# Patient Record
Sex: FEMALE | Race: WHITE | NOT HISPANIC OR LATINO | Employment: FULL TIME | URBAN - METROPOLITAN AREA
[De-identification: names, ages, dates, MRNs, and addresses within clinical notes are randomized per-mention and may not be internally consistent; named-entity substitution may affect disease eponyms.]

---

## 2020-01-01 ENCOUNTER — HOSPITAL ENCOUNTER (EMERGENCY)
Facility: HOSPITAL | Age: 40
Discharge: HOME | End: 2020-01-02
Attending: EMERGENCY MEDICINE
Payer: COMMERCIAL

## 2020-01-01 DIAGNOSIS — K59.00 CONSTIPATION: ICD-10-CM

## 2020-01-01 DIAGNOSIS — K62.89 RECTAL PAIN: Primary | ICD-10-CM

## 2020-01-01 DIAGNOSIS — K64.9 HEMORRHOIDS, UNSPECIFIED HEMORRHOID TYPE: ICD-10-CM

## 2020-01-01 LAB
ALBUMIN SERPL BCP-MCNC: 3.9 G/DL (ref 3.5–5.2)
ALP SERPL-CCNC: 55 U/L (ref 55–135)
ALT SERPL W/O P-5'-P-CCNC: 16 U/L (ref 10–44)
ANION GAP SERPL CALC-SCNC: 11 MMOL/L (ref 8–16)
AST SERPL-CCNC: 15 U/L (ref 10–40)
B-HCG UR QL: NEGATIVE
BACTERIA #/AREA URNS HPF: ABNORMAL /HPF
BASOPHILS # BLD AUTO: 0.09 K/UL (ref 0–0.2)
BASOPHILS NFR BLD: 0.9 % (ref 0–1.9)
BILIRUB SERPL-MCNC: 0.3 MG/DL (ref 0.1–1)
BILIRUB UR QL STRIP: NEGATIVE
BUN SERPL-MCNC: 9 MG/DL (ref 6–20)
CALCIUM SERPL-MCNC: 9.4 MG/DL (ref 8.7–10.5)
CAOX CRY URNS QL MICRO: ABNORMAL
CHLORIDE SERPL-SCNC: 103 MMOL/L (ref 95–110)
CLARITY UR: CLEAR
CO2 SERPL-SCNC: 25 MMOL/L (ref 23–29)
COLOR UR: YELLOW
CREAT SERPL-MCNC: 1 MG/DL (ref 0.5–1.4)
DIFFERENTIAL METHOD: NORMAL
EOSINOPHIL # BLD AUTO: 0.3 K/UL (ref 0–0.5)
EOSINOPHIL NFR BLD: 2.9 % (ref 0–8)
ERYTHROCYTE [DISTWIDTH] IN BLOOD BY AUTOMATED COUNT: 12 % (ref 11.5–14.5)
EST. GFR  (AFRICAN AMERICAN): >60 ML/MIN/1.73 M^2
EST. GFR  (NON AFRICAN AMERICAN): >60 ML/MIN/1.73 M^2
GLUCOSE SERPL-MCNC: 107 MG/DL (ref 70–110)
GLUCOSE UR QL STRIP: NEGATIVE
HCT VFR BLD AUTO: 38.5 % (ref 37–48.5)
HGB BLD-MCNC: 12.9 G/DL (ref 12–16)
HGB UR QL STRIP: ABNORMAL
HIV 1+2 AB+HIV1 P24 AG SERPL QL IA: NEGATIVE
IMM GRANULOCYTES # BLD AUTO: 0.04 K/UL (ref 0–0.04)
IMM GRANULOCYTES NFR BLD AUTO: 0.4 % (ref 0–0.5)
KETONES UR QL STRIP: ABNORMAL
LEUKOCYTE ESTERASE UR QL STRIP: NEGATIVE
LYMPHOCYTES # BLD AUTO: 3 K/UL (ref 1–4.8)
LYMPHOCYTES NFR BLD: 29.7 % (ref 18–48)
MCH RBC QN AUTO: 29.9 PG (ref 27–31)
MCHC RBC AUTO-ENTMCNC: 33.5 G/DL (ref 32–36)
MCV RBC AUTO: 89 FL (ref 82–98)
MICROSCOPIC COMMENT: ABNORMAL
MONOCYTES # BLD AUTO: 0.6 K/UL (ref 0.3–1)
MONOCYTES NFR BLD: 6.3 % (ref 4–15)
NEUTROPHILS # BLD AUTO: 6.1 K/UL (ref 1.8–7.7)
NEUTROPHILS NFR BLD: 59.8 % (ref 38–73)
NITRITE UR QL STRIP: NEGATIVE
NRBC BLD-RTO: 0 /100 WBC
PH UR STRIP: 6 [PH] (ref 5–8)
PLATELET # BLD AUTO: 300 K/UL (ref 150–350)
PMV BLD AUTO: 10.9 FL (ref 9.2–12.9)
POTASSIUM SERPL-SCNC: 3.9 MMOL/L (ref 3.5–5.1)
PROT SERPL-MCNC: 7.1 G/DL (ref 6–8.4)
PROT UR QL STRIP: NEGATIVE
RBC # BLD AUTO: 4.31 M/UL (ref 4–5.4)
RBC #/AREA URNS HPF: 1 /HPF (ref 0–4)
SODIUM SERPL-SCNC: 139 MMOL/L (ref 136–145)
SP GR UR STRIP: >=1.03 (ref 1–1.03)
URN SPEC COLLECT METH UR: ABNORMAL
UROBILINOGEN UR STRIP-ACNC: NEGATIVE EU/DL
WBC # BLD AUTO: 10.16 K/UL (ref 3.9–12.7)
WBC #/AREA URNS HPF: 1 /HPF (ref 0–5)

## 2020-01-01 PROCEDURE — 25500020 PHARM REV CODE 255: Performed by: EMERGENCY MEDICINE

## 2020-01-01 PROCEDURE — 63600175 PHARM REV CODE 636 W HCPCS: Performed by: NURSE PRACTITIONER

## 2020-01-01 PROCEDURE — 85025 COMPLETE CBC W/AUTO DIFF WBC: CPT

## 2020-01-01 PROCEDURE — 80053 COMPREHEN METABOLIC PANEL: CPT

## 2020-01-01 PROCEDURE — 81000 URINALYSIS NONAUTO W/SCOPE: CPT

## 2020-01-01 PROCEDURE — 36415 COLL VENOUS BLD VENIPUNCTURE: CPT

## 2020-01-01 PROCEDURE — 99285 EMERGENCY DEPT VISIT HI MDM: CPT | Mod: 25

## 2020-01-01 PROCEDURE — 81025 URINE PREGNANCY TEST: CPT

## 2020-01-01 PROCEDURE — 86703 HIV-1/HIV-2 1 RESULT ANTBDY: CPT

## 2020-01-01 PROCEDURE — 96374 THER/PROPH/DIAG INJ IV PUSH: CPT | Mod: 59

## 2020-01-01 RX ORDER — BISACODYL 5 MG
5 TABLET, DELAYED RELEASE (ENTERIC COATED) ORAL ONCE
COMMUNITY

## 2020-01-01 RX ORDER — DICLOFENAC SODIUM 50 MG/1
50 TABLET, DELAYED RELEASE ORAL 3 TIMES DAILY PRN
Qty: 15 TABLET | Refills: 0 | Status: SHIPPED | OUTPATIENT
Start: 2020-01-01

## 2020-01-01 RX ORDER — HYDROCODONE BITARTRATE AND ACETAMINOPHEN 7.5; 325 MG/1; MG/1
1 TABLET ORAL EVERY 6 HOURS PRN
COMMUNITY

## 2020-01-01 RX ORDER — KETOROLAC TROMETHAMINE 30 MG/ML
30 INJECTION, SOLUTION INTRAMUSCULAR; INTRAVENOUS
Status: COMPLETED | OUTPATIENT
Start: 2020-01-01 | End: 2020-01-01

## 2020-01-01 RX ORDER — ESCITALOPRAM OXALATE 20 MG/1
20 TABLET ORAL DAILY
COMMUNITY

## 2020-01-01 RX ADMIN — IOHEXOL 100 ML: 350 INJECTION, SOLUTION INTRAVENOUS at 11:01

## 2020-01-01 RX ADMIN — KETOROLAC TROMETHAMINE 30 MG: 30 INJECTION, SOLUTION INTRAMUSCULAR at 10:01

## 2020-01-02 ENCOUNTER — OFFICE VISIT (OUTPATIENT)
Dept: GASTROENTEROLOGY | Facility: CLINIC | Age: 40
End: 2020-01-02
Payer: COMMERCIAL

## 2020-01-02 VITALS
HEIGHT: 66 IN | HEART RATE: 80 BPM | BODY MASS INDEX: 47.09 KG/M2 | DIASTOLIC BLOOD PRESSURE: 74 MMHG | WEIGHT: 293 LBS | SYSTOLIC BLOOD PRESSURE: 128 MMHG

## 2020-01-02 VITALS
HEART RATE: 63 BPM | BODY MASS INDEX: 47.09 KG/M2 | TEMPERATURE: 98 F | WEIGHT: 293 LBS | RESPIRATION RATE: 16 BRPM | DIASTOLIC BLOOD PRESSURE: 67 MMHG | OXYGEN SATURATION: 99 % | HEIGHT: 66 IN | SYSTOLIC BLOOD PRESSURE: 145 MMHG

## 2020-01-02 DIAGNOSIS — K92.1 BLOOD IN STOOL: Primary | ICD-10-CM

## 2020-01-02 DIAGNOSIS — K59.00 CONSTIPATION, UNSPECIFIED CONSTIPATION TYPE: ICD-10-CM

## 2020-01-02 DIAGNOSIS — K60.2 ANAL FISSURE: ICD-10-CM

## 2020-01-02 DIAGNOSIS — K62.89 RECTAL PAIN: ICD-10-CM

## 2020-01-02 PROCEDURE — 99204 PR OFFICE/OUTPT VISIT, NEW, LEVL IV, 45-59 MIN: ICD-10-PCS | Mod: S$GLB,,, | Performed by: INTERNAL MEDICINE

## 2020-01-02 PROCEDURE — 3008F BODY MASS INDEX DOCD: CPT | Mod: CPTII,S$GLB,, | Performed by: INTERNAL MEDICINE

## 2020-01-02 PROCEDURE — 99999 PR PBB SHADOW E&M-EST. PATIENT-LVL III: CPT | Mod: PBBFAC,,, | Performed by: INTERNAL MEDICINE

## 2020-01-02 PROCEDURE — 99204 OFFICE O/P NEW MOD 45 MIN: CPT | Mod: S$GLB,,, | Performed by: INTERNAL MEDICINE

## 2020-01-02 PROCEDURE — 3008F PR BODY MASS INDEX (BMI) DOCUMENTED: ICD-10-PCS | Mod: CPTII,S$GLB,, | Performed by: INTERNAL MEDICINE

## 2020-01-02 PROCEDURE — 99999 PR PBB SHADOW E&M-EST. PATIENT-LVL III: ICD-10-PCS | Mod: PBBFAC,,, | Performed by: INTERNAL MEDICINE

## 2020-01-02 RX ORDER — MULTIVITAMIN
1 TABLET ORAL DAILY
COMMUNITY

## 2020-01-02 RX ORDER — SODIUM, POTASSIUM,MAG SULFATES 17.5-3.13G
1 SOLUTION, RECONSTITUTED, ORAL ORAL DAILY
Qty: 1 KIT | Refills: 0 | Status: SHIPPED | OUTPATIENT
Start: 2020-01-02 | End: 2020-01-04

## 2020-01-02 NOTE — ED PROVIDER NOTES
"SCRIBE #1 NOTE: I, Sandro Renteria, am scribing for, and in the presence of, Barrett Josue NP. I have scribed the HPI, ROS.     SCRIBE #2 NOTE: I, Raman Rucker, am scribing for, and in the presence of,  Barrett Josue NP. I have scribed the remaining portions of the note not scribed by Scribe #1.      History     Chief Complaint   Patient presents with    Rectal Pain     rectal pain and constipation x4 weeks; believes it may be an "anal fissure"     Review of patient's allergies indicates:  No Known Allergies      History of Present Illness     HPI    2020, 9:43 PM  History obtained from the patient      History of Present Illness: Ingrid Mai is a 39 y.o. female patient with a PMHx of anxiety, depression who presents to the Emergency Department for evaluation of a possible anal fissure which onset gradually 4 weeks PTA. Pt reports that she has had an anal fissure before, and that her current sxs are similar to previous fissures. Symptoms are constant and moderate in severity. No mitigating or exacerbating factors reported. Associated sxs include anal bleeding, rectal pain, constipation. Patient denies any fever, chills, sore throat, cough, n/v/d, CP, SOB, HA, syncope, weakness, and all other sxs at this time. No further complaints or concerns at this time.         Arrival mode: Personal vehicle     PCP: Primary Doctor No        Past Medical History:  Past Medical History:   Diagnosis Date    Anxiety     Depression        Past Surgical History:  Past Surgical History:   Procedure Laterality Date     SECTION      CHOLECYSTECTOMY           Family History:  History reviewed. No pertinent family history.      Social History:  Social History     Tobacco Use    Smoking status: Current Every Day Smoker     Types: Vaping with nicotine   Substance and Sexual Activity    Alcohol use: Not on file    Drug use: Not on file    Sexual activity: Yes     Birth control/protection: IUD        Review of Systems "     Review of Systems   Constitutional: Negative for activity change, appetite change, chills, diaphoresis and fever.   HENT: Negative for congestion, drooling, ear pain, mouth sores, rhinorrhea, sinus pain, sore throat and trouble swallowing.    Eyes: Negative for pain and discharge.   Respiratory: Negative for cough, chest tightness, shortness of breath, wheezing and stridor.    Cardiovascular: Negative for chest pain, palpitations and leg swelling.   Gastrointestinal: Positive for anal bleeding, constipation and rectal pain. Negative for abdominal distention, abdominal pain, blood in stool, diarrhea, nausea and vomiting.        (+) possible anal fissure     Genitourinary: Negative for difficulty urinating, dysuria, flank pain, frequency, hematuria and urgency.   Musculoskeletal: Negative for arthralgias, back pain and myalgias.   Skin: Negative for pallor, rash and wound.   Neurological: Negative for dizziness, syncope, weakness, light-headedness and numbness.   All other systems reviewed and are negative.       Physical Exam     Initial Vitals [01/01/20 2113]   BP Pulse Resp Temp SpO2   (!) 150/75 69 16 98.2 °F (36.8 °C) 97 %      MAP       --          Physical Exam  Nursing Notes and Vital Signs Reviewed.  Constitutional: Patient is in no acute distress. Obese.  Head: Atraumatic. Normocephalic.  Eyes: PERRL. EOM intact. Conjunctivae are not pale. No scleral icterus.  ENT: Mucous membranes are moist. Oropharynx is clear and symmetric.    Neck: Supple. Full ROM. No lymphadenopathy.  Cardiovascular: Regular rate. Regular rhythm. No murmurs, rubs, or gallops. Distal pulses are 2+ and symmetric.  Pulmonary/Chest: No respiratory distress. Clear to auscultation bilaterally. No wheezing or rales.  Abdominal: Soft and non-distended.  There is no tenderness.  No rebound, guarding, or rigidity. Good bowel sounds.  Genitourinary: No CVA tenderness  Musculoskeletal: Moves all extremities. No obvious deformities. No edema.  "No calf tenderness.  Skin: Warm and dry.  Neurological:  Alert, awake, and appropriate.  Normal speech.  No acute focal neurological deficits are appreciated.  Psychiatric: Normal affect. Good eye contact. Appropriate in content.  Rectal: Female chaperone present for the duration of the rectal exam. There are external hemorrhoids. There is severe tenderness at the anus so exam limited.       ED Course   Procedures  ED Vital Signs:  Vitals:    01/01/20 2113 01/02/20 0010   BP: (!) 150/75 (!) 145/67   Pulse: 69 63   Resp: 16 16   Temp: 98.2 °F (36.8 °C) 98.4 °F (36.9 °C)   TempSrc: Oral Oral   SpO2: 97% 99%   Weight: 134 kg (295 lb 6.7 oz)    Height: 5' 6" (1.676 m)        Abnormal Lab Results:  Labs Reviewed   URINALYSIS, REFLEX TO URINE CULTURE - Abnormal; Notable for the following components:       Result Value    Specific Gravity, UA >=1.030 (*)     Ketones, UA Trace (*)     Occult Blood UA 2+ (*)     All other components within normal limits   URINALYSIS MICROSCOPIC - Abnormal; Notable for the following components:    Ca Oxalate Svetlana, UA Many (*)     All other components within normal limits   HIV 1 / 2 ANTIBODY   CBC W/ AUTO DIFFERENTIAL   COMPREHENSIVE METABOLIC PANEL   PREGNANCY TEST, URINE RAPID   URINALYSIS, REFLEX TO URINE CULTURE        All Lab Results:  Results for orders placed or performed during the hospital encounter of 01/01/20   HIV 1/2 Ag/Ab (4th Gen)   Result Value Ref Range    HIV 1/2 Ag/Ab Negative Negative   CBC auto differential   Result Value Ref Range    WBC 10.16 3.90 - 12.70 K/uL    RBC 4.31 4.00 - 5.40 M/uL    Hemoglobin 12.9 12.0 - 16.0 g/dL    Hematocrit 38.5 37.0 - 48.5 %    Mean Corpuscular Volume 89 82 - 98 fL    Mean Corpuscular Hemoglobin 29.9 27.0 - 31.0 pg    Mean Corpuscular Hemoglobin Conc 33.5 32.0 - 36.0 g/dL    RDW 12.0 11.5 - 14.5 %    Platelets 300 150 - 350 K/uL    MPV 10.9 9.2 - 12.9 fL    Immature Granulocytes 0.4 0.0 - 0.5 %    Gran # (ANC) 6.1 1.8 - 7.7 K/uL    " Immature Grans (Abs) 0.04 0.00 - 0.04 K/uL    Lymph # 3.0 1.0 - 4.8 K/uL    Mono # 0.6 0.3 - 1.0 K/uL    Eos # 0.3 0.0 - 0.5 K/uL    Baso # 0.09 0.00 - 0.20 K/uL    nRBC 0 0 /100 WBC    Gran% 59.8 38.0 - 73.0 %    Lymph% 29.7 18.0 - 48.0 %    Mono% 6.3 4.0 - 15.0 %    Eosinophil% 2.9 0.0 - 8.0 %    Basophil% 0.9 0.0 - 1.9 %    Differential Method Automated    Comprehensive metabolic panel   Result Value Ref Range    Sodium 139 136 - 145 mmol/L    Potassium 3.9 3.5 - 5.1 mmol/L    Chloride 103 95 - 110 mmol/L    CO2 25 23 - 29 mmol/L    Glucose 107 70 - 110 mg/dL    BUN, Bld 9 6 - 20 mg/dL    Creatinine 1.0 0.5 - 1.4 mg/dL    Calcium 9.4 8.7 - 10.5 mg/dL    Total Protein 7.1 6.0 - 8.4 g/dL    Albumin 3.9 3.5 - 5.2 g/dL    Total Bilirubin 0.3 0.1 - 1.0 mg/dL    Alkaline Phosphatase 55 55 - 135 U/L    AST 15 10 - 40 U/L    ALT 16 10 - 44 U/L    Anion Gap 11 8 - 16 mmol/L    eGFR if African American >60 >60 mL/min/1.73 m^2    eGFR if non African American >60 >60 mL/min/1.73 m^2   Pregnancy, urine rapid (UPT)   Result Value Ref Range    Preg Test, Ur Negative    Urinalysis, Reflex to Urine Culture   Result Value Ref Range    Specimen UA Urine, Supra Pubic     Color, UA Yellow Yellow, Straw, Jade    Appearance, UA Clear Clear    pH, UA 6.0 5.0 - 8.0    Specific Gravity, UA >=1.030 (A) 1.005 - 1.030    Protein, UA Negative Negative    Glucose, UA Negative Negative    Ketones, UA Trace (A) Negative    Bilirubin (UA) Negative Negative    Occult Blood UA 2+ (A) Negative    Nitrite, UA Negative Negative    Urobilinogen, UA Negative <2.0 EU/dL    Leukocytes, UA Negative Negative   Urinalysis Microscopic   Result Value Ref Range    RBC, UA 1 0 - 4 /hpf    WBC, UA 1 0 - 5 /hpf    Bacteria Rare None-Occ /hpf    Ca Oxalate Svetlana, UA Many (A) None-Moderate    Microscopic Comment SEE COMMENT          Imaging Results:  Imaging Results          CT Abdomen Pelvis With Contrast (Final result)  Result time 01/01/20 23:44:51    Final  result by Terence Sanders MD (01/01/20 23:44:51)                 Impression:      There are bilateral nonobstructing kidney stones.  No perirectal abscess is demonstrated.    All CT scans at (this location) are performed using dose modulation techniques as appropriate to a performed exam including the following:  automated exposure control; adjustment of the mA and /or kV according to patient size (this includes techniques or standardized protocols for targeted exams where dose is matched to indication/reason for exam: i.e. extremities or head); use of iterative reconstruction technique.      Electronically signed by: Terence Sanders  Date:    01/01/2020  Time:    23:44             Narrative:    EXAMINATION:  CT ABDOMEN PELVIS WITH CONTRAST    CLINICAL HISTORY:  Localized swelling, mass and lump of skin, subcu;rectal pain/ anal tenderness;    TECHNIQUE:  Low dose axial images, sagittal and coronal reformations were obtained from the lung bases to the pubic symphysis following the IV administration of 100 mL of Omnipaque 350 .    COMPARISON:  None.    FINDINGS:  ABDOMEN    Lung bases: Unremarkable    Liver/gallbladder/biliary: The liver demonstrates no focal abnormality.  An elongated appearance to the right lobe is present.  This has the appearance of a normal variant Riedel's lobe.  The gallbladder is surgically absent.No biliary ductal dilation.    Pancreas: The pancreas is unremarkable in appearance.    Spleen: The spleen is not enlarged.    Adrenals: Unremarkable    Kidneys: There are punctate nonobstructing stones in lower pole the right kidney.  A larger stone measuring 0.5 cm is seen in the upper pole of the left kidney with notched appearance to the upper pole cortex suggesting old scarring.  A small punctate 0.3 cm stone is seen in the lower pole.  The kidneys bilaterally appear free of acute obstruction or mass.  The ureters are normal in appearance.  The bladder appears free of focal  abnormality.    Bowel/Mesentery: There is no evidence of bowel obstruction.  No mesenteric stranding or adenopathy.    Retroperitoneum: No adenopathy.The aorta demonstrates a normal caliber.    The patient reports anal fissure.  No rectal or perirectal abscess or suspicious fluid collections are demonstrated.    PELVIS:    Genitourinary/Reproductive organs: An IUD is present which appears appropriately positioned within the uterus.    Adenopathy: None    Free Fluid: No free fluid    Osseus Structures/Soft tissues: No suspicious appearing osseus lesions. No significant soft tissue abnormality.                               X-Ray Abdomen Flat And Erect (Final result)  Result time 01/01/20 22:04:25    Final result by SHAE Sarmiento Sr., MD (01/01/20 22:04:25)                 Impression:      1. There is a prominent amount of fecal material within the gastrointestinal system.  This is consistent with the patient's history.  2. There is a 5 mm calcification projected over the midpole of the left kidney.  This is characteristic of nephrolithiasis.  3. Surgical changes  4. There are several oval-shaped calcific densities projected over the pelvis.  These are characteristic of phleboliths.      Electronically signed by: Faisal Sarmiento MD  Date:    01/01/2020  Time:    22:04             Narrative:    EXAMINATION:  XR ABDOMEN FLAT AND ERECT    CLINICAL HISTORY:  Constipation, unspecified    COMPARISON:  None    FINDINGS:  There is a prominent amount of fecal material within the gastrointestinal system.  There is a 5 mm calcification projected over the midpole of the left kidney.  There are surgical clips projected over the right upper quadrant of the abdomen.  There is no pneumoperitoneum. The bony structures appear intact.  There is a T-type intrauterine device projected over the pelvis.  There are several oval-shaped calcific densities projected over the pelvis.                                        The Emergency  Provider reviewed the vital signs and test results, which are outlined above.     ED Discussion   I discussed with patient and/or family/caretaker that evaluation in the ED does not suggest any emergent or life threatening medical conditions requiring immediate intervention beyond what was provided in the ED, and I believe patient is safe for discharge. Regardless, an unremarkable evaluation in the ED does not preclude the development or presence of a serious of life threatening condition. As such, patient was instructed to return immediately for any worsening or change in current symptoms.    Regarding HEMORRHOIDS, I recommended that the patient manage symptoms by: applying ice on rectum for 15 to 20 minutes every hour (using an ice pack) to prevent tissue damage and decrease swelling and pain; taking sitz baths (20 minutes, two or three times daily); and keeping anal area clean (washing gently with warm water or moist towelettes). For hemorrhoid prevention, recommended that the patient avoid straining when having bowel movements, exercise regularly, drink plenty of liquids, eat high fiber foods, and avoid anal sex.  Advised patient to follow up with gastroenterologist or primary care provider for further evaluation and treatment.                  ED Medication(s):  Medications   ketorolac injection 30 mg (30 mg Intravenous Given 1/1/20 6844)   iohexol (OMNIPAQUE 350) injection 100 mL (100 mLs Intravenous Given 1/1/20 7697)       Discharge Medication List as of 1/1/2020 11:57 PM      START taking these medications    Details   diclofenac (VOLTAREN) 50 MG EC tablet Take 1 tablet (50 mg total) by mouth 3 (three) times daily as needed., Starting Wed 1/1/2020, Print      hydrocortisone-pramoxine (PROCTOFOAM-HS) rectal foam Place 1 applicator rectally 2 (two) times daily., Starting Wed 1/1/2020, Print             Follow-up Information     Schedule an appointment as soon as possible for a visit  with [facility]  Gastroenterology.    Specialty:  Gastroenterology  Contact information:  [address]  [facility] [address] [phone]           [facility] - .    Specialty:  Emergency Medicine  Why:  As needed, If symptoms worsen  Contact information:  [address]  [facility] [address] [phone]                     Scribe Attestation:   Scribe #1: I performed the above scribed service and the documentation accurately describes the services I performed. I attest to the accuracy of the note.     Attending:   Physician Attestation Statement for Scribe #1: I, Barrett Josue NP, personally performed the services described in this documentation, as scribed by Sandro Renteria, in my presence, and it is both accurate and complete.       Scribe Attestation:   Scribe #2: I performed the above scribed service and the documentation accurately describes the services I performed. I attest to the accuracy of the note.    Attending Attestation:           Physician Attestation for Scribe:    Physician Attestation Statement for Scribe #2: I, Barrett Josue NP, reviewed documentation, as scribed by Raman Rucker in my presence, and it is both accurate and complete. I also acknowledge and confirm the content of the note done by Scribe #1.           Clinical Impression       ICD-10-CM ICD-9-CM   1. Rectal pain K62.89 569.42   2. Constipation K59.00 564.00   3. Hemorrhoids, unspecified hemorrhoid type K64.9 455.6       Disposition:   Disposition: Discharged  Condition: Stable         Barrett Josue NP  01/02/20 0130

## 2020-01-02 NOTE — PROGRESS NOTES
[facility]  Gastroenterology    PCP: Primary Doctor No    20    [facility] [place]     Er Follow up      Additional comments: Possible Anal Fissure          Last edited by Lauryn Cortés LPN on 2020  2:55 PM. (History)        Reason for Visit: ER Follow-up     Subjective:   Ingrid Mai is a 39 y.o. female with history of anxiety/depression, chronic constipation and anal fissure who presents for ER follow-up. Patient reports she has had longstanding constipation and a history of anal fissure in the past. Recently, over the holidays she had constipation again and after straining to have a BM, she developed severe rectal pain consistent with an anal fissure, similar to the pain she had the time before. She did not some blood from rectum on occasion. She has been taking stool softeners but no Miralax. The pain has been going on for about 2-3 weeks. The pain worsened significantly yesterday after passing another hard stool so she went to the ED for evaluation. They did CT A/P which was negative for any anal abscess. She was too tender for rectal exam at that time. She was prescribed proctofoam and scheduled GI follow-up. She has not filled the proctofoam yet. She still has rectal pain and constipation. She has never had a colonoscopy before.       Past Medical History:   Diagnosis Date    Anxiety     Depression        Past Surgical History:   Procedure Laterality Date     SECTION      CHOLECYSTECTOMY         Current Outpatient Medications on File Prior to Visit   Medication Sig Dispense Refill    bisacodyl (DULCOLAX) 5 mg EC tablet Take 5 mg by mouth once.       cetirizine (ZYRTEC) 10 mg Cap Take 10 mg by mouth.      escitalopram oxalate (LEXAPRO) 20 MG tablet Take 20 mg by mouth once daily.      multivitamin (THERAGRAN) per tablet Take 1 tablet by mouth once daily.      diclofenac (VOLTAREN) 50 MG EC tablet Take 1 tablet (50 mg total) by mouth 3 (three) times daily as needed. (Patient not  taking: Reported on 1/2/2020) 15 tablet 0    HYDROcodone-acetaminophen (NORCO) 7.5-325 mg per tablet Take 1 tablet by mouth every 6 (six) hours as needed for Pain.      hydrocortisone-pramoxine (PROCTOFOAM-HS) rectal foam Place 1 applicator rectally 2 (two) times daily. (Patient not taking: Reported on 1/2/2020) 12 applicator 0     Current Facility-Administered Medications on File Prior to Visit   Medication Dose Route Frequency Provider Last Rate Last Dose    [COMPLETED] iohexol (OMNIPAQUE 350) injection 100 mL  100 mL Intravenous ONCE PRN Agustín Hernandez Jr., MD   100 mL at 01/01/20 2329    [COMPLETED] ketorolac injection 30 mg  30 mg Intravenous ED 1 Time Barrett Josue NP   30 mg at 01/01/20 2244       Review of patient's allergies indicates:  No Known Allergies    Social History     Socioeconomic History    Marital status:      Spouse name: Not on file    Number of children: Not on file    Years of education: Not on file    Highest education level: Not on file   Occupational History    Not on file   Social Needs    Financial resource strain: Not on file    Food insecurity:     Worry: Not on file     Inability: Not on file    Transportation needs:     Medical: Not on file     Non-medical: Not on file   Tobacco Use    Smoking status: Current Every Day Smoker     Types: Vaping with nicotine   Substance and Sexual Activity    Alcohol use: Not on file    Drug use: Not on file    Sexual activity: Yes     Birth control/protection: IUD   Lifestyle    Physical activity:     Days per week: Not on file     Minutes per session: Not on file    Stress: Not on file   Relationships    Social connections:     Talks on phone: Not on file     Gets together: Not on file     Attends Christian service: Not on file     Active member of club or organization: Not on file     Attends meetings of clubs or organizations: Not on file     Relationship status: Not on file   Other Topics Concern    Not on file    Social History Narrative    Not on file       No family history on file.    Review of Systems   Constitutional: Negative for appetite change, fever and unexpected weight change.   HENT: Negative for postnasal drip, rhinorrhea, sneezing, sore throat and trouble swallowing.    Eyes: Negative for visual disturbance.   Respiratory: Negative for cough, shortness of breath and wheezing.    Cardiovascular: Negative for chest pain, palpitations and leg swelling.   Gastrointestinal: Positive for blood in stool, constipation and rectal pain. Negative for abdominal pain, diarrhea, nausea and vomiting.   Genitourinary: Negative for dysuria.   Musculoskeletal: Negative for arthralgias, joint swelling and myalgias.   Skin: Negative for color change, pallor and rash.   Neurological: Negative for weakness, light-headedness, numbness and headaches.   Hematological: Negative for adenopathy. Does not bruise/bleed easily.   Psychiatric/Behavioral: Negative for agitation.           Objective:   Vitals:   Vitals:    01/02/20 1455   BP: 128/74   Pulse: 80       Physical Exam   Constitutional: She is oriented to person, place, and time. No distress.   HENT:   Head: Normocephalic and atraumatic.   Mouth/Throat: No oropharyngeal exudate.   Eyes: Pupils are equal, round, and reactive to light. Conjunctivae and EOM are normal. Right eye exhibits no discharge. Left eye exhibits no discharge. No scleral icterus.   Neck: Normal range of motion.   Cardiovascular: Normal rate, regular rhythm and normal heart sounds. Exam reveals no gallop and no friction rub.   No murmur heard.  Pulmonary/Chest: Effort normal and breath sounds normal. No stridor. No respiratory distress. She has no wheezes. She has no rales.   Abdominal: Soft. Bowel sounds are normal. She exhibits no distension and no mass. There is no tenderness. There is no guarding.   Genitourinary:   Genitourinary Comments: Rectal exam not performed due to extreme tenderness in area    Musculoskeletal: Normal range of motion. She exhibits no edema.   Neurological: She is alert and oriented to person, place, and time.   Skin: Skin is warm and dry. No rash noted. She is not diaphoretic. No erythema. No pallor.   Psychiatric: She has a normal mood and affect.   Vitals reviewed.        X-ray Abdomen Flat And Erect    Result Date: 1/1/2020  EXAMINATION: XR ABDOMEN FLAT AND ERECT   FINDINGS: There is a prominent amount of fecal material within the gastrointestinal system.  There is a 5 mm calcification projected over the midpole of the left kidney.  There are surgical clips projected over the right upper quadrant of the abdomen.  There is no pneumoperitoneum. The bony structures appear intact.  There is a T-type intrauterine device projected over the pelvis.  There are several oval-shaped calcific densities projected over the pelvis.     1. There is a prominent amount of fecal material within the gastrointestinal system.  This is consistent with the patient's history. 2. There is a 5 mm calcification projected over the midpole of the left kidney.  This is characteristic of nephrolithiasis. 3. Surgical changes 4. There are several oval-shaped calcific densities projected over the pelvis.  These are characteristic of phleboliths.     Ct Abdomen Pelvis With Contrast    Result Date: 1/1/2020  EXAMINATION: CT ABDOMEN PELVIS WITH CONTRAST    FINDINGS: ABDOMEN Lung bases: Unremarkable Liver/gallbladder/biliary: The liver demonstrates no focal abnormality.  An elongated appearance to the right lobe is present.  This has the appearance of a normal variant Riedel's lobe.  The gallbladder is surgically absent.No biliary ductal dilation. Pancreas: The pancreas is unremarkable in appearance. Spleen: The spleen is not enlarged. Adrenals: Unremarkable Kidneys: There are punctate nonobstructing stones in lower pole the right kidney.  A larger stone measuring 0.5 cm is seen in the upper pole of the left kidney with  notched appearance to the upper pole cortex suggesting old scarring.  A small punctate 0.3 cm stone is seen in the lower pole.  The kidneys bilaterally appear free of acute obstruction or mass.  The ureters are normal in appearance.  The bladder appears free of focal abnormality. Bowel/Mesentery: There is no evidence of bowel obstruction.  No mesenteric stranding or adenopathy. Retroperitoneum: No adenopathy.The aorta demonstrates a normal caliber. The patient reports anal fissure.  No rectal or perirectal abscess or suspicious fluid collections are demonstrated. PELVIS: Genitourinary/Reproductive organs: An IUD is present which appears appropriately positioned within the uterus. Adenopathy: None Free Fluid: No free fluid Osseus Structures/Soft tissues: No suspicious appearing osseus lesions. No significant soft tissue abnormality.     There are bilateral nonobstructing kidney stones.  No perirectal abscess is demonstrated. All CT scans at (this location) are performed using dose modulation techniques as appropriate to a performed exam including the following:  automated exposure control; adjustment of the mA and /or kV according to patient size (this includes techniques or standardized protocols for targeted exams where dose is matched to indication/reason for exam: i.e. extremities or head); use of iterative reconstruction technique.      IMPRESSION     Problem List Items Addressed This Visit     None      Visit Diagnoses     Blood in stool    -  Primary    Relevant Orders    Case request GI: COLONOSCOPY (Completed)    Constipation, unspecified constipation type        Relevant Orders    Case request GI: COLONOSCOPY (Completed)    Anal fissure        Rectal pain              PLANS:    - Recommend Miralax daily to prevent any constipation, avoid straining  - Continue with Sitz baths and may fill the Proctofoam as needed   - Will prescribe 2% lidocaine topical jelly to be used to area PRN for pain control  - For  symptoms of blood in stool, will plan for colonoscopy in 1-2 months once anal fissure has healed. Suprep e-scribed to patient's pharmacy    Blood in stool  -     Case request GI: COLONOSCOPY    Constipation, unspecified constipation type  -     Case request GI: COLONOSCOPY    Anal fissure    Rectal pain    Other orders  -     sodium,potassium,mag sulfates (SUPREP BOWEL PREP KIT) 17.5-3.13-1.6 gram SolR; Take 177 mLs by mouth once daily. for 2 days  Dispense: 1 kit; Refill: 0  -     lidocaine HCl 2 % Crea; Apply 1 application topically 2 (two) times daily as needed.      Penny Bundy MD  Gastroenterology and Hepatology

## 2020-03-03 ENCOUNTER — TELEPHONE (OUTPATIENT)
Dept: GASTROENTEROLOGY | Facility: CLINIC | Age: 40
End: 2020-03-03

## 2020-03-03 NOTE — TELEPHONE ENCOUNTER
----- Message from Ann Marie Austin sent at 3/3/2020  2:00 PM CST -----  Contact: pt  Pt called needs to cancel her procedure      [id number] Patient name: Livia Mai [[id number]]  Date: 3/9/2020 Status: Scheduled  Time: 0800 Length (mins): 30  Surgeons: Penny Bundy MD [[id number]] Procedure(s): COLONOSCOPY [2323]  Service: Endoscopy Location: [facility] ENDO  Room: [place] [address] Priority:    Patient class: OP- Outpatient Procedures Case type:     Due to cost     Pt can be reached at [phone]

## 2020-03-03 NOTE — TELEPHONE ENCOUNTER
Will call her again tomorrow before canceling her Colonoscopy, left her a message I would call again tomorrow.

## 2020-03-04 ENCOUNTER — TELEPHONE (OUTPATIENT)
Dept: GASTROENTEROLOGY | Facility: CLINIC | Age: 40
End: 2020-03-04

## 2020-03-04 NOTE — TELEPHONE ENCOUNTER
Tried to call Patient to assure she truly wants her Colonoscopy canceled, unable to speak with her left a message to call the clinic.